# Patient Record
Sex: FEMALE | Race: WHITE | NOT HISPANIC OR LATINO | RURAL
[De-identification: names, ages, dates, MRNs, and addresses within clinical notes are randomized per-mention and may not be internally consistent; named-entity substitution may affect disease eponyms.]

---

## 2024-05-19 ENCOUNTER — OFFICE VISIT (OUTPATIENT)
Dept: FAMILY MEDICINE | Facility: CLINIC | Age: 24
End: 2024-05-19
Payer: COMMERCIAL

## 2024-05-19 VITALS
HEIGHT: 62 IN | TEMPERATURE: 98 F | WEIGHT: 155 LBS | HEART RATE: 85 BPM | OXYGEN SATURATION: 99 % | RESPIRATION RATE: 15 BRPM | DIASTOLIC BLOOD PRESSURE: 72 MMHG | SYSTOLIC BLOOD PRESSURE: 107 MMHG | BODY MASS INDEX: 28.52 KG/M2

## 2024-05-19 DIAGNOSIS — J30.9 ALLERGIC RHINITIS, UNSPECIFIED SEASONALITY, UNSPECIFIED TRIGGER: Primary | ICD-10-CM

## 2024-05-19 PROCEDURE — 1159F MED LIST DOCD IN RCRD: CPT | Mod: ,,, | Performed by: NURSE PRACTITIONER

## 2024-05-19 PROCEDURE — 99203 OFFICE O/P NEW LOW 30 MIN: CPT | Mod: 25,,, | Performed by: NURSE PRACTITIONER

## 2024-05-19 PROCEDURE — 96372 THER/PROPH/DIAG INJ SC/IM: CPT | Mod: ,,, | Performed by: NURSE PRACTITIONER

## 2024-05-19 PROCEDURE — 1160F RVW MEDS BY RX/DR IN RCRD: CPT | Mod: ,,, | Performed by: NURSE PRACTITIONER

## 2024-05-19 PROCEDURE — 3078F DIAST BP <80 MM HG: CPT | Mod: ,,, | Performed by: NURSE PRACTITIONER

## 2024-05-19 PROCEDURE — 3008F BODY MASS INDEX DOCD: CPT | Mod: ,,, | Performed by: NURSE PRACTITIONER

## 2024-05-19 PROCEDURE — 99051 MED SERV EVE/WKEND/HOLIDAY: CPT | Mod: ,,, | Performed by: NURSE PRACTITIONER

## 2024-05-19 PROCEDURE — 3074F SYST BP LT 130 MM HG: CPT | Mod: ,,, | Performed by: NURSE PRACTITIONER

## 2024-05-19 RX ORDER — DEXAMETHASONE SODIUM PHOSPHATE 4 MG/ML
4 INJECTION, SOLUTION INTRA-ARTICULAR; INTRALESIONAL; INTRAMUSCULAR; INTRAVENOUS; SOFT TISSUE ONCE
Status: COMPLETED | OUTPATIENT
Start: 2024-05-19 | End: 2024-05-19

## 2024-05-19 RX ORDER — METHYLPREDNISOLONE ACETATE 40 MG/ML
40 INJECTION, SUSPENSION INTRA-ARTICULAR; INTRALESIONAL; INTRAMUSCULAR; SOFT TISSUE ONCE
Status: COMPLETED | OUTPATIENT
Start: 2024-05-19 | End: 2024-05-19

## 2024-05-19 RX ORDER — LORATADINE 10 MG/1
10 TABLET ORAL DAILY
Qty: 30 TABLET | Refills: 0 | Status: SHIPPED | OUTPATIENT
Start: 2024-05-19 | End: 2024-06-18

## 2024-05-19 RX ADMIN — DEXAMETHASONE SODIUM PHOSPHATE 4 MG: 4 INJECTION, SOLUTION INTRA-ARTICULAR; INTRALESIONAL; INTRAMUSCULAR; INTRAVENOUS; SOFT TISSUE at 03:05

## 2024-05-19 RX ADMIN — METHYLPREDNISOLONE ACETATE 40 MG: 40 INJECTION, SUSPENSION INTRA-ARTICULAR; INTRALESIONAL; INTRAMUSCULAR; SOFT TISSUE at 03:05

## 2024-05-20 NOTE — PROGRESS NOTES
NICK Thakur [facility] - [facility] - FAMILY MEDICINE  [facility] [address] [place] [address]  [phone]      PATIENT NAME: Alyce Benson  : 2000  DATE: 24  MRN: [id number]      Billing Provider: NICK Thakur  Level of Service: [place] OFFICE/OUTPT VISIT, NEW, LEVL III, 30-44 MIN  Patient PCP Information       Provider PCP Type    Primary Doctor No General            Reason for Visit / Chief Complaint: Sinusitis and Sinus Problem (X- Friday )       Update PCP  Update Chief Complaint         History of Present Illness / Problem Focused Workflow       Patient presents to clinic with the above listed complaints.       Review of Systems     Review of Systems   Constitutional:  Negative for chills, diaphoresis, fatigue and fever.   HENT:  Positive for congestion, ear pain, postnasal drip, rhinorrhea and sneezing. Negative for facial swelling and trouble swallowing.    Eyes:  Negative for pain, discharge, redness, itching and visual disturbance.   Respiratory:  Negative for apnea, cough, chest tightness, shortness of breath and wheezing.    Cardiovascular:  Negative for chest pain, palpitations and leg swelling.   Gastrointestinal:  Negative for abdominal pain, constipation, diarrhea, nausea and vomiting.   Genitourinary:  Negative for dysuria.   Skin:  Negative for rash.   Neurological:  Negative for dizziness and headaches.     Medical / Social / Family History   History reviewed. No pertinent past medical history.    History reviewed. No pertinent surgical history.    Social History  MsBerenice  reports that she has never smoked. She has never been exposed to tobacco smoke. She has never used smokeless tobacco.    Family History  Ms.'s family history is not on file.    Medications and Allergies     Medications  No outpatient medications have been marked as taking for the 24 encounter (Office Visit) with Emeli Major FNP.      Current Facility-Administered Medications for the 5/19/24 encounter (Office Visit) with Emeli Major FNP   Medication Dose Route Frequency Provider Last Rate Last Admin    [COMPLETED] dexAMETHasone injection 4 mg  4 mg Intramuscular Once Emeli Major FNP   4 mg at 05/19/24 1546    [COMPLETED] methylPREDNISolone acetate injection 40 mg  40 mg Intramuscular Once Emeli Major FNP   40 mg at 05/19/24 1546       Allergies  Review of patient's allergies indicates:  No Known Allergies    Physical Examination     Vitals:    05/19/24 1511   BP: 107/72   Pulse: 85   Resp: 15   Temp: 98.2 °F (36.8 °C)     Physical Exam  Vitals and nursing note reviewed.   Constitutional:       General: She is awake.      Appearance: Normal appearance.   HENT:      Head: Normocephalic.      Right Ear: Tympanic membrane, ear canal and external ear normal.      Left Ear: Tympanic membrane, ear canal and external ear normal.      Nose: Congestion and rhinorrhea present.      Right Turbinates: Swollen.      Left Turbinates: Swollen.      Mouth/Throat:      Lips: Pink.      Mouth: Mucous membranes are moist.      Pharynx: Oropharynx is clear. Uvula midline. Posterior oropharyngeal erythema present.   Eyes:      General: Lids are normal.      Extraocular Movements: Extraocular movements intact.      Conjunctiva/sclera: Conjunctivae normal.      Pupils: Pupils are equal, round, and reactive to light.   Cardiovascular:      Rate and Rhythm: Normal rate and regular rhythm.      Pulses: Normal pulses.      Heart sounds: Normal heart sounds. No murmur heard.  Pulmonary:      Effort: Pulmonary effort is normal.      Breath sounds: Normal breath sounds. No decreased breath sounds, wheezing, rhonchi or rales.   Musculoskeletal:      Right lower leg: No edema.      Left lower leg: No edema.   Skin:     General: Skin is warm.      Coloration: Skin is not jaundiced.      Findings: No rash.   Neurological:      Mental Status: She is alert.  Mental status is at baseline.   Psychiatric:         Mood and Affect: Mood normal.         Behavior: Behavior normal. Behavior is cooperative.     Assessment and Plan (including Health Maintenance)      Problem List  Smart Sets  Document Outside HM   :    Health Maintenance Due   Topic Date Due    Hepatitis C Screening  Never done    Lipid Panel  Never done    HIV Screening  Never done    HPV Vaccines (1 - 3-dose series) Never done    TETANUS VACCINE  Never done    Pap Smear  Never done    COVID-19 Vaccine (1 - 2023-24 season) Never done       Problem List Items Addressed This Visit    None  Visit Diagnoses       Allergic rhinitis, unspecified seasonality, unspecified trigger    -  Primary    Relevant Medications    dexAMETHasone injection 4 mg (Completed)    methylPREDNISolone acetate injection 40 mg (Completed)    loratadine (CLARITIN) 10 mg tablet            Health Maintenance Topics with due status: Not Due       Topic Last Completion Date    Influenza Vaccine Not Due       No future appointments.       Signature:  NICK Thakur [facility] - [facility] - FAMILY MEDICINE  [address] [place] [id number]  [phone]    Date of encounter: 5/19/24

## 2025-02-13 DIAGNOSIS — Z30.09 FAMILY PLANNING: Primary | ICD-10-CM
